# Patient Record
Sex: MALE | Race: WHITE | NOT HISPANIC OR LATINO | Employment: FULL TIME | URBAN - METROPOLITAN AREA
[De-identification: names, ages, dates, MRNs, and addresses within clinical notes are randomized per-mention and may not be internally consistent; named-entity substitution may affect disease eponyms.]

---

## 2018-03-12 ENCOUNTER — CLINICAL SUPPORT (OUTPATIENT)
Dept: INTERNAL MEDICINE | Facility: CLINIC | Age: 38
End: 2018-03-12

## 2018-03-12 VITALS — HEIGHT: 74 IN

## 2018-03-12 DIAGNOSIS — Z00.00 PHYSICAL EXAM: Primary | ICD-10-CM

## 2018-03-12 PROCEDURE — 99201 PR OFFICE/OUTPT VISIT,NEW,LEVL I: CPT | Mod: ,,, | Performed by: INTERNAL MEDICINE

## 2018-03-12 NOTE — PROGRESS NOTES
Nate Barnes has presented today on behalf of [employer]. Nate has completed a Routine or Annual (5 year) Physical Exam. Nate has also completed Non-DOT Physical.     Jerad Lange